# Patient Record
Sex: MALE | URBAN - METROPOLITAN AREA
[De-identification: names, ages, dates, MRNs, and addresses within clinical notes are randomized per-mention and may not be internally consistent; named-entity substitution may affect disease eponyms.]

---

## 2017-10-05 ENCOUNTER — TELEPHONE (OUTPATIENT)
Dept: SURGERY | Facility: CLINIC | Age: 42
End: 2017-10-05

## 2017-10-05 NOTE — TELEPHONE ENCOUNTER
Pre Visit Call and Assessment    Date of call:  10/05/2017    Phone numbers:  Home number on file [phone] (home)    Reached patient/confirmed appointment:  Voice mail   Patient care team/Primary provider:  No primary care provider on file.  Preferred outpatient pharmacy:  No Pharmacies Listed  Referred to:  Dr. Morales Koenig    Reason for visit: inguinal hernia     Problem List:  There is no problem list on file for this patient.      Allergies:   Allergies not on file    History:      No past medical history on file.    No past surgical history on file.    No family history on file.    Social History   Substance Use Topics     Smoking status: Not on file     Smokeless tobacco: Not on file     Alcohol use Not on file       Patient instructions:    *  Bring outside medical records, images/disc, and/or studies

## 2017-10-06 ENCOUNTER — OFFICE VISIT (OUTPATIENT)
Dept: SURGERY | Facility: CLINIC | Age: 42
End: 2017-10-06

## 2017-10-06 VITALS
TEMPERATURE: 98.1 F | OXYGEN SATURATION: 96 % | SYSTOLIC BLOOD PRESSURE: 115 MMHG | WEIGHT: 136.6 LBS | BODY MASS INDEX: 20.23 KG/M2 | HEART RATE: 66 BPM | DIASTOLIC BLOOD PRESSURE: 74 MMHG | HEIGHT: 69 IN

## 2017-10-06 DIAGNOSIS — K40.90 LEFT INGUINAL HERNIA: Primary | ICD-10-CM

## 2017-10-06 RX ORDER — ZOLPIDEM TARTRATE 10 MG/1
10 TABLET ORAL
COMMUNITY
Start: 2015-08-25 | End: 2017-11-16

## 2017-10-06 RX ORDER — VALACYCLOVIR HYDROCHLORIDE 1 G/1
TABLET, FILM COATED ORAL
COMMUNITY
Start: 2014-12-29 | End: 2017-11-16

## 2017-10-06 RX ORDER — FLUTICASONE PROPIONATE 50 MCG
2 SPRAY, SUSPENSION (ML) NASAL
COMMUNITY
Start: 2013-11-06

## 2017-10-06 RX ORDER — IBUPROFEN 200 MG
200 TABLET ORAL
COMMUNITY
Start: 2014-12-29

## 2017-10-06 RX ORDER — VALACYCLOVIR HYDROCHLORIDE 1 G/1
1 TABLET, FILM COATED ORAL
COMMUNITY
Start: 2014-12-29

## 2017-10-06 RX ORDER — FLUTICASONE PROPIONATE 50 MCG
1 SPRAY, SUSPENSION (ML) NASAL
COMMUNITY
Start: 2014-12-29 | End: 2017-11-16

## 2017-10-06 RX ORDER — AZITHROMYCIN 250 MG/1
TABLET, FILM COATED ORAL
COMMUNITY
Start: 2015-08-25 | End: 2017-11-16

## 2017-10-06 RX ORDER — FEXOFENADINE HCL 60 MG/1
TABLET, FILM COATED ORAL
COMMUNITY
Start: 2011-12-07

## 2017-10-06 ASSESSMENT — PAIN SCALES - GENERAL: PAINLEVEL: NO PAIN (0)

## 2017-10-06 NOTE — NURSING NOTE
"Chief Complaint   Patient presents with     Consult     consult       Vitals:    10/06/17 1347   BP: 115/74   BP Location: Left arm   Patient Position: Chair   Cuff Size: Adult Regular   Pulse: 66   Temp: 98.1  F (36.7  C)   SpO2: 96%   Weight: 136 lb 9.6 oz   Height: 5' 9\"       Body mass index is 20.17 kg/(m^2).  Juan Templeton MA                          "

## 2017-10-06 NOTE — LETTER
Khang Gary  [address] [place] [address]  [facility] [place] [address]      SURGERY PACKET            Your surgery is scheduled:    Date: Friday, November 17  ________________________________    Time: 7:15 am  ________________________________    Please arrive at:  6 am  ______________________    Surgeon's Name: Dr. Morales Koenig  _______________________        Pre-Op Physical Fax Numbers:          [facility]  Fax: [phone]  Phone: [phone]        Your surgery is located at:  [facility] [place] [address]  [phone]       Before Your Surgery  For Patients and Visitors at [facility]    Welcome  As you get ready for surgery, you may have a lot of questions.  This brochure will help you know what to expect before and after surgery.  You and your family are the most important members of your health care team.  You will need to take an active role in your care.    Be sure to ask questions and learn all that you can about your surgery.  If you have any safety concerns, we urge you to tell a nurse as soon as possible.   This brochure is for information only.  It does not replace the advice of your doctor.  Always follow your doctor's advice.    Please tell us if you need a .    GETTING READY FOR SURGERY  Always follow your surgeon's instructions.  If you don't, your surgery could be canceled.  Please use the following checklist.    Within 30 Days of Surgery:    Have a pre-surgery physical exam with your family doctor or partner.    If you use a [facility], all of your information from the pre-op physical will be in the [employer] computer system.    Ask the doctor to send all of your results to the hospital before the surgery.  The doctor also may ask you to bring the results with you on the day of surgery or you can fax them to [phone].  Tell the doctor if:    You are allergic to latex or rubber  (Latex and rubber gloves are  often used in medical care).    You are taking any medicines (including aspirin), vitamins (Vitamin E, Fish Oil, Omegas) or herbal products.  You will need to stop taking some medicines before surgery.    You have any medical problems (allergies, diabetes or heart disease, for example).    You have a pacemaker or an AICD (automatic implanted cardiac defibrillator).  If you do, please bring the ID card with you on the day of surgery.    You are a smoker.  People who smoke have a higher risk of infection after surgery.  Ask your doctor how you can quit smoking.  Within 7 days of Surgery:    Pre-register with the hospital.  Please use the hospital's phone number listed on the first page of this brochure.  Or, to register online, go to [email or web address].      Prior to your surgical procedure, a nurse will be contacting you to obtain a health history ([phone]).  Additionally, someone from the Admissions Department will also contact you for preregistration ([phone]).      Call your insurance company.  Ask if you need pre-approval for your surgery.  If you do not have insurance, please let us know.      Arrange for someone to drive you home after surgery.  If you will have same-day surgery, you may not drive or take public transportation home by yourself.    Arrange for someone to stay with you for 24 hours after you go home.  This person must be a responsible adult (ie- Family member or friend).  The Day Before Surgery:     Call your surgeon if there are any changes in your health.  This includes signs of a cold or flu (such as a sore throat, runny nose, cough, rash or fever).    Do not smoke, drink alcohol or take over-the-counter medicine (unless your surgeon tells you to) for 24 hours before and after surgery.    If you take prescribed drugs:  You may need to stop them until after the surgery.  Follow your doctor's orders.  You may resume Aspirin and/or blood thinners after your surgery as directed by your  physician/surgeon.    NO FOOD OR DRINK AFTER MIDNIGHT.  Follow your surgeon's orders for eating and drinking.  You need to have an empty stomach before surgery.  This will make the surgery as safe as possible.  If you don't follow your doctor's orders, your surgery could be changed to another date.  A nurse will call you within a few days of surgery to go over these and other instructions.  If you do not hear from them, please call them at [phone]  The Day of Surgery:    Take a shower or bath the night before and the morning of surgery.  Use antiseptic soap or the soap your surgeon gave you.  Gently clean the skin.  Do not shave or scrub your surgery site.    Please remove deodorant, cologne, scented lotion, makeup, nail polish and jewelry (including rings and body piercings).  If you wear artificial nails, please remove at least one nail before coming to the hospital.    Wear clean, loose clothing to the hospital.    Bring these items to the hospital:  1. Your insurance card.  2. Money for parking and co-pays (for medicines or the surgery), if needed.   3. A list of all the medicines you take.  Include vitamins, minerals, herbs and over-the-counter drugs.  Note any drug allergies.  4. A copy of your advance health care directive, if you have one.  This tells us what treatment you would want -- and who would make health care decisions -- if you could no longer speak for yourself.  You may request this form in advance or download it from [email or web address].  5. A case for any glasses, contact lenses, hearing aids or dentures.  6. Your inhaler or CPAP machine, if you use these at home.  Leave extra cash, jewelry and other valuables at home.    When You Arrive:  When you get to the hospital, you will:    Check in.  If you are under age 18, you must be with a parent or legal guardian.    Sign consent forms, if you haven't already.  These forms state that you know the risks and benefits of surgery.  When you  sign the forms, you give us permission to do the surgery.  Do not sign them unless you understand what will happen during and after your surgery.  If you have any questions about your surgery, ask to speak with your doctor before you sign the forms.  If you don't understand the answers, ask again.    Receive a copy of the Patients Bill of Rights.  If you do not receive a copy, please ask for one.    Change into hospital clothes.  Your belongings will be placed in a bag.  We will return them to you after surgery.    Meet with the anesthesia provider.  He or she will tell you what kind of anesthesia (medicine) will be used to keep you comfortable during surgery.  Remember: It's okay to remind doctors and nurses to wash their hands before touching you.   In most cases, your surgeon will use a marker to write his or her initials on the surgery site.  This ensures that the exact site is operated on.  For safety reasons, we will ask you the same questions many times.  For example, we may ask your name and birth date over and over again.  Friends and family can stay with you until it's time for surgery.  While you're in surgery, they will be in the waiting area.  Please note that cell phones are not allowed in some patient care areas.  If you have questions about what will happen in the operating room, talk to your care team.  After Surgery:    We will move you to a recovery room where we will watch you closely.  If you have any pain or discomfort, tell your nurse.  He or she will try to make you comfortable.      If you are staying overnight we will move you to your hospital room after you are awake.    If you are going home we will move you to another room.  Friends and family may be able to join you.  The length of time you spend in recovery depends on the type of medicine you received, your medical condition, and the type of surgery you had.  Dealing with pain:  A nurse will check your comfort level often during your  "stay.  He or she will work with you to manage your pain.  Remember:    All pain is real.  There are many ways to control pain.  We can help you decide what works best for you.    Ask for pain medicine when you need it.  Don't try to \"tough it out\" -- this can make you feel worse.  Always take your medicine as ordered.    Medicine doesn't work the same for everyone.  If your medicine isn't working tell your nurse.  There may be other medicines or treatments we can try.  Going Home:  We will let you know when you're ready to leave the hospital.  Before you leave, we will tell you how to care for yourself at home and prevent infections.  If you do not understand something, please say so.  We will answer any questions you have.  We will then help you get ready to leave.  You must have an adult with you for the first 24 hours after you leave the hospital. Take it easy when you get home.  You will need some time to recover -- you may be more tired than you realize at first.  Rest and relax for at least the first 24 hours at home.  You'll feel better and heal faster if you take good care of yourself.                      Pre-Operative Surgery Scrub    Purpose:   The skin harbors a large variety of bacteria, both infectious and noninfectious.  Showering with an antiseptic soap prior to an invasive procedure will decrease the number of transient and resident (good and bad) bacteria that is normally found on the skin.    Procedure:      Shower or bathe with 1/2 of the bottle of antiseptic soap (enclosed) the evening before and 1/2 of the bottle the morning of surgery (bathing the day of the procedure is most important).       Apply the soap at full strength (use the entire bottle).  Gently clean the skin, rinse, and dry with a clean towel that is freshly laundered (out of the dryer) and then put on clean clothing that is freshly laundered.        We have given you information regarding pre-op showering.  We recommend that " patients wash with an antiseptic soap prior to surgery.  This cleanser will be given to you at the clinic or mailed to your home.  It is advised that you liberally wash the specific area surgery area the night before, and again in the morning before the surgery.  Do not apply lotion afterward.  We would like to keep the skin as clean as possible.    Thank you for following these important instructions.      You have been scheduled for surgery and we would like to give you some information that will assist in helping get the best possible outcome.      Before Surgery:   If for any reason you decide not to have the surgery, please contact our office.  We can easily cancel or reschedule the procedure. Please call the  at [phone].      Any pain related to the surgery that occurs before the surgery needs to be reported and managed by your primary care or referring doctor.      Please keep in mind that the time of surgery is subject to change.  Make sure you have nothing to eat or drink after midnight.  If your surgery is later in the afternoon, this recommendation might change, but not until the day before surgery after the actual time of the surgery has been established.    After Surgery:  When you are discharged from the recovery room, the nurses will review instructions with you and your caregiver.      Please wash your hands every time you touch the wound or change bandages or dressings.      Do not submerge the wound in water.  You may not use a bathtub or hot tub until the wound is closed.  The wait time frame is generally 2-3 weeks but any open area can be a source of incoming bacteria, so it is better to be on the safe side and avoid the tub until your wound is fully healed.      You may take a shower 24 hours after surgery.  Double check with your surgeon if it is ok for water to run over a wound, whether it has been sutured, stapled, glued or is open.  You may gently wash the wound using the  antiseptic soap provided for your pre-surgery showering (do not use a washcloth).  Any mild soap will work as well.      Many surgical wounds will have small white strips of tape on them called Steri Strips.  Do not remove these.  The edges will curl and fall off within 7-10 days with normal showering.      If you are going home with sutures (stitches) or staples, you must return to the clinic to have them taken out, usually within 1-2 weeks.      Signs and symptoms of infection include:  1. Fever, temperature over 101.5 ' F  2. Redness  3. Swelling  4. Increasing pain  5. Green or yellow drainage which may or may not have a foul odor.    Symptoms of infection need to be reported to your surgery office. Please call the nurse at [phone], Option #3.    If you or your  are deaf or hard of hearing, or prefer a language other than English, please let us know.  We have many free services, including interpreters and other aids to help you communicate. You may ask for help  through any member of your care team or by calling Language Services at [phone], option 2.

## 2017-10-06 NOTE — PATIENT INSTRUCTIONS
Laparoscopic Inguinal Hernia Repair Teaching Sheet      1.  Wound care:  Remove Band-Aids 24 hours after surgery.  Leave the medical tape (Steri-strips) in place.  The tape should stay on for 5-7 days.  You may remove the Steri-Strips after one week.   Please wash your hands before touching your incisions or removing dressings.    2.  You may resume all of your home medications after surgery.  Please do not start Aspirin or blood thinners until the day after surgery.    3.  You may shower 24 hours after surgery.  Do not submerge yourself in water for 7 days (bath, whirlpool, hot tub, pool, lake, etc).      4.  Restrictions:  None    5.  Pain management:  Apply ice pack to groin for 24 hours protecting the skin with a cloth.  Take prescribed pain medication as directed and only as needed.  Please do not take any additional Acetaminophen or Tylenol products while taking narcotic pain medications.  We encourage the use of Ibuprofen, Advil, or Motrin after surgery except if you have an allergy, ulcer, kidney problems, or it is contraindicated by a provider.  A TAP Block may be recommended the day of surgery (see information sheet below).    6.  Athletic supporter (male):  Wear for the first 3 days.  You may wear longer if you wish.    7.  Urinary retention:  If you are unable to urinate, feel pressure.  Please go to the Emergency Room at the [facility].  A catheter will be placed to empty your bladder and should be left in for a minimum of 48 hours.  The catheter can be removed in our office.    8.  It is normal to feel a lump in your groin after surgery.  This lump may take up to 8 weeks to completely go away.    9.  Our office will call you 2-4 days after your procedure to review post-op teaching, answer questions, and help arrange after surgery care.    10. Watch for signs of infection:  Redness of the wound, drainage, increasing pain, and/or fever/chills (greater than 101  degrees F).    11. Constipation:  Please take prescribed stool softener as directed.  You may stop taking it when you are no longer taking narcotic pain medications and your bowels have returned to normal.  If you become constipated it is safe to take an over-the-counter laxative as directed on the bottle.    12. Driving:  You may drive  when you are no longer taking prescription pain medications  and you feel comfortable operating a vehicle.       13. Diet:  You may resume your regular diet after surgery.        If you have questions or concerns please contact our office Monday through Friday during regular office hours at [phone].  If you are calling during nonbusiness hours, the weekend, or holiday please call the hospital  at [phone] and ask for the on-call General Surgeon.

## 2017-10-06 NOTE — LETTER
10/6/2017       RE: Khang Gary  [address] [place] [id number]     Dear Colleague,    Thank you for referring your patient, Khang Gary, to the [facility] GENERAL SURGERY at [facility]. Please see a copy of my visit note below.    Khang Gary is a 42 year old male with a 7 month history of a left inguinal mass with the following symptoms of lump.    Finding was not work related.  Onset did not occur with lifting.  Obstructive symptoms:  no  Urinary difficulties:  no  Chronic cough: no  Constipation:  no  Current level of activity:  Low, plays golf and chases his twin eight year old girls.    Past medical and surgical history, medications, allergies, family history, and social history were reviewed with the patient. Had open RIH at age 18. Works IT.    ROS: 10 point review of systems negative except noted in HPI  PHYSICAL EXAM  General appearance- healthy, alert, and in no distress.  Skin- Skin color, texture, and turgor normal.  No rashes.  Neck- Neck is supple with no obvious adenopathy.  Lungs- Respiratory effort unlabored.  Gait- Normal.  Abdomen - flat soft non tender  Left inguinal hernia, right without defect.    Impression:  Inguinal hernia, left  Recommendation:  Laparoscopic Inguinal Hernioplasty    A full discussion regarding the alternatives, risks, goals, and potential complications for this surgery was completed today.  The patient understood that the potential problems included but are not limited to:  Infection, bleeding, hematoma, seroma, recurrence, injury to nerve/muscle or involved testicle, ischemic orchitis, and chronic pain.    The patient verbally expressed understanding, was given the opportunity for questions, and gives full informed consent for the procedure.      Today the patient was instructed on the need for a preoperative H&P, NPO status prior to surgery, and the need to stop anticoagulants prior to surgery.  Additional educational material,  soap, and instructions will be mailed out to the patients home.    The total time spent with this patient was 30 minutes.  Of this time, greater than 50% was spent counseling and coordinating care.      Lovenox:  No            Again, thank you for allowing me to participate in the care of your patient.      Sincerely,    Morales Koenig MD

## 2017-10-06 NOTE — LETTER
Date:October 9, 2017      Patient was self referred, no letter generated. Do not send.        [facility] Health Information

## 2017-10-06 NOTE — NURSING NOTE
Pre and Post op Patient Education/Teaching Flowsheet  Relevant Diagnosis:  hernia  Teaching Topic:  Pre and post op teaching  Person(s) Involved in teaching:  Patient and Wife     Motivation Level:  Asks Questions:  Yes  Eager to Learn:  Yes  Cooperative:  Yes  Receptive (willing/able to accept information):  Yes  Any cultural factors/Faith beliefs that may influence understanding or compliance?  No    Patient/caregiver/family demonstrates understanding of the following:  Reason for the appointment, diagnosis, and treatment plan:  Yes  Patient demonstrates understanding of the following:  Pre-op bowel prep:  No  Post-op pain management recommendations (medications, ice compress, binder/athletic supporter (if applicable), etc.:  Yes  Inguinal hernia patients:  Post-op urinary retention- discussed signs/symptoms and visit to ER for Grajeda catheter placement and to stay in place for at least 48 hours:  Yes  Restrictions:  Yes  Medications to take the day of surgery:  Per PCP  Blood thinner medications discussed and when to stop (if applicable):  Yes  Wound care:  Yes  Diabetes medication management (if applicable):  Per PCP  Which situations necessitate calling provider and whom to contact:  Discussed how to contact the hospital, nurse, and clinic scheduling staff if necessary      Date and time of surgery:  Yes  Location of surgery:  [facility] [place]- 5th Floor  History and Physical and any other testing necessary prior to surgery:  Yes  Required time line for completion of History and Physical and any pre-op testing:  Yes  Discuss need for someone to drive patient home and stay with them for 24 hours:  Yes  Pre-op showering/scrub information with Surgical Scrub:  Yes  NPO Guidelines:  NPO per Anesthesia Guidelines      Infection Prevention: Patient demonstrates understanding of the following:  Patient instructed on hand hygiene:  Yes  Surgical procedure site care will be taught and  will be reviewed at the time of discharge  Signs and symptoms of infection taught:  Yes  Wound care reviewed and will be taught at the time of discharge  Central venous catheter care will be taught at the time of discharge (if applicable)    Post-op follow-up:  Instructional materials used/given/mailed:  [facility] Booklet, post op teaching sheet, Map, Soap, and arrival/location information    Surgical instructions mailed to patient

## 2017-10-06 NOTE — MR AVS SNAPSHOT
After Visit Summary   10/6/2017    Khang Gary    MRN: [id number]           Patient Information     Date Of Birth          1975        Visit Information        Provider Department      10/6/2017 2:00 PM Morales Koenig MD [facility] Surgery        Care Instructions    Laparoscopic Inguinal Hernia Repair Teaching Sheet      1.  Wound care:  Remove Band-Aids 24 hours after surgery.  Leave the medical tape (Steri-strips) in place.  The tape should stay on for 5-7 days.  You may remove the Steri-Strips after one week.   Please wash your hands before touching your incisions or removing dressings.    2.  You may resume all of your home medications after surgery.  Please do not start Aspirin or blood thinners until the day after surgery.    3.  You may shower 24 hours after surgery.  Do not submerge yourself in water for 7 days (bath, whirlpool, hot tub, pool, lake, etc).      4.  Restrictions:  None    5.  Pain management:  Apply ice pack to groin for 24 hours protecting the skin with a cloth.  Take prescribed pain medication as directed and only as needed.  Please do not take any additional Acetaminophen or Tylenol products while taking narcotic pain medications.  We encourage the use of Ibuprofen, Advil, or Motrin after surgery except if you have an allergy, ulcer, kidney problems, or it is contraindicated by a provider.  A TAP Block may be recommended the day of surgery (see information sheet below).    6.  Athletic supporter (male):  Wear for the first 3 days.  You may wear longer if you wish.    7.  Urinary retention:  If you are unable to urinate, feel pressure.  Please go to the Emergency Room at the [facility].  A catheter will be placed to empty your bladder and should be left in for a minimum of 48 hours.  The catheter can be removed in our office.    8.  It is normal to feel a lump in your groin after surgery.  This lump may take up to 8  weeks to completely go away.    9.  Our office will call you 2-4 days after your procedure to review post-op teaching, answer questions, and help arrange after surgery care.    10. Watch for signs of infection:  Redness of the wound, drainage, increasing pain, and/or fever/chills (greater than 101 degrees F).    11. Constipation:  Please take prescribed stool softener as directed.  You may stop taking it when you are no longer taking narcotic pain medications and your bowels have returned to normal.  If you become constipated it is safe to take an over-the-counter laxative as directed on the bottle.    12. Driving:  You may drive  when you are no longer taking prescription pain medications  and you feel comfortable operating a vehicle.       13. Diet:  You may resume your regular diet after surgery.        If you have questions or concerns please contact our office Monday through Friday during regular office hours at [phone].  If you are calling during nonbusiness hours, the weekend, or holiday please call the hospital  at [phone] and ask for the on-call General Surgeon.                        Follow-ups after your visit        Who to contact     Please call your clinic at [phone] to:    Ask questions about your health    Make or cancel appointments    Discuss your medicines    Learn about your test results    Speak to your doctor   If you have compliments or concerns about an experience at your clinic, or if you wish to file a complaint, please contact [facility] Patient Relations at [phone] or email us at [email or web address]         Additional Information About Your Visit        MyChart Information     [employer] gives you secure access to your electronic health record. If you see a primary care provider, you can also send messages to your care team and make appointments. If you have questions, please call your primary care clinic.  If you do not have  "a primary care provider, please call [phone] and they will assist you.      [employer] is an electronic gateway that provides easy, online access to your medical records. With [employer], you can request a clinic appointment, read your test results, renew a prescription or communicate with your care team.     To access your existing account, please contact your [facility] or call [phone] for assistance.        Care EveryWhere ID     This is your Care EveryWhere ID. This could be used by other organizations to access your [place] medical records  [id number]        Your Vitals Were     Pulse Temperature Height Pulse Oximetry BMI (Body Mass Index)       66 98.1  F (36.7  C) 1.753 m (5' 9\") 96% 20.17 kg/m2        Blood Pressure from Last 3 Encounters:   10/06/17 115/74    Weight from Last 3 Encounters:   10/06/17 62 kg (136 lb 9.6 oz)              Today, you had the following     No orders found for display       Primary Care Provider    None Specified       No primary provider on file.        Equal Access to Services     TERRY [facility] Center[facility]ACQUI : Hadii aad ku hadasho Soomaali, waaxda luqadaha, qaybta kaalmada adeegyada, nolberto rueda . So [facility] [phone].    ATENCIÓN: Si habla español, tiene a santana disposición servicios gratuitos de asistencia lingüística. Llame al [phone].    We comply with applicable federal civil rights laws and [place] laws. We do not discriminate on the basis of race, color, national origin, age, disability, sex, sexual orientation, or gender identity.            Thank you!     Thank you for choosing [facility]  for your care. Our goal is always to provide you with excellent care. Hearing back from our patients is one way we can continue to improve our services. Please take a few minutes to complete the written survey that you may receive in the mail after your visit with us. Thank you!             Your Updated Medication " List - Protect others around you: Learn how to safely use, store and throw away your medicines at www.disposemymeds.org.          This list is accurate as of: 10/6/17  2:05 PM.  Always use your most recent med list.                   Brand Name Dispense Instructions for use Diagnosis    azithromycin 250 MG tablet    ZITHROMAX     Take 2 tablets by mouth daily for 3 days  Indications: TRAVELER'S DIARRHEA        fexofenadine 60 MG tablet    ALLEGRA     TAKE ONE TABLET BY MOUTH TWICE DAILY AS NEEDED        * fluticasone 50 MCG/ACT spray    FLONASE     2 sprays        * fluticasone 50 MCG/ACT spray    FLONASE     1 spray        ibuprofen 200 MG tablet    ADVIL/MOTRIN     Take 200 mg by mouth        * valACYclovir 1000 mg tablet    VALTREX     Indications: PN:   HOOD GALDAMEZ Aug 25, 2015 10:07 AM Received from: External Pharmacy        * valACYclovir 1000 mg tablet    VALTREX     Take 1 g by mouth        zolpidem 10 MG tablet    AMBIEN     Take 10 mg by mouth        * Notice:  This list has 4 medication(s) that are the same as other medications prescribed for you. Read the directions carefully, and ask your doctor or other care provider to review them with you.

## 2017-10-06 NOTE — PROGRESS NOTES
Khang Gary is a 42 year old male with a 7 month history of a left inguinal mass with the following symptoms of lump.    Finding was not work related.  Onset did not occur with lifting.  Obstructive symptoms:  no  Urinary difficulties:  no  Chronic cough: no  Constipation:  no  Current level of activity:  Low, plays golf and chases his twin eight year old girls.    Past medical and surgical history, medications, allergies, family history, and social history were reviewed with the patient. Had open RIH at age 18. Works IT.    ROS: 10 point review of systems negative except noted in HPI  PHYSICAL EXAM  General appearance- healthy, alert, and in no distress.  Skin- Skin color, texture, and turgor normal.  No rashes.  Neck- Neck is supple with no obvious adenopathy.  Lungs- Respiratory effort unlabored.  Gait- Normal.  Abdomen - flat soft non tender  Left inguinal hernia, right without defect.    Impression:  Inguinal hernia, left  Recommendation:  Laparoscopic Inguinal Hernioplasty    A full discussion regarding the alternatives, risks, goals, and potential complications for this surgery was completed today.  The patient understood that the potential problems included but are not limited to:  Infection, bleeding, hematoma, seroma, recurrence, injury to nerve/muscle or involved testicle, ischemic orchitis, and chronic pain.    The patient verbally expressed understanding, was given the opportunity for questions, and gives full informed consent for the procedure.      Today the patient was instructed on the need for a preoperative H&P, NPO status prior to surgery, and the need to stop anticoagulants prior to surgery.  Additional educational material, soap, and instructions will be mailed out to the patients home.    The total time spent with this patient was 30 minutes.  Of this time, greater than 50% was spent counseling and coordinating care.      Lovenox:  No

## 2017-11-16 ENCOUNTER — ANESTHESIA EVENT (OUTPATIENT)
Dept: SURGERY | Facility: AMBULATORY SURGERY CENTER | Age: 42
End: 2017-11-16

## 2017-11-17 ENCOUNTER — SURGERY (OUTPATIENT)
Age: 42
End: 2017-11-17

## 2017-11-17 ENCOUNTER — HOSPITAL ENCOUNTER (OUTPATIENT)
Facility: AMBULATORY SURGERY CENTER | Age: 42
End: 2017-11-17
Attending: SURGERY

## 2017-11-17 ENCOUNTER — ANESTHESIA (OUTPATIENT)
Dept: SURGERY | Facility: AMBULATORY SURGERY CENTER | Age: 42
End: 2017-11-17

## 2017-11-17 VITALS
TEMPERATURE: 97.2 F | HEART RATE: 65 BPM | BODY MASS INDEX: 20.29 KG/M2 | DIASTOLIC BLOOD PRESSURE: 83 MMHG | SYSTOLIC BLOOD PRESSURE: 123 MMHG | WEIGHT: 137 LBS | RESPIRATION RATE: 16 BRPM | OXYGEN SATURATION: 98 % | HEIGHT: 69 IN

## 2017-11-17 DIAGNOSIS — K40.90 LEFT INGUINAL HERNIA: Primary | ICD-10-CM

## 2017-11-17 DEVICE — IMPLANTABLE DEVICE: Type: IMPLANTABLE DEVICE | Status: FUNCTIONAL

## 2017-11-17 RX ORDER — ACETAMINOPHEN 325 MG/1
975 TABLET ORAL ONCE
Status: COMPLETED | OUTPATIENT
Start: 2017-11-17 | End: 2017-11-17

## 2017-11-17 RX ORDER — PROPOFOL 10 MG/ML
INJECTION, EMULSION INTRAVENOUS PRN
Status: DISCONTINUED | OUTPATIENT
Start: 2017-11-17 | End: 2017-11-17

## 2017-11-17 RX ORDER — FENTANYL CITRATE 50 UG/ML
25-50 INJECTION, SOLUTION INTRAMUSCULAR; INTRAVENOUS EVERY 5 MIN PRN
Status: DISCONTINUED | OUTPATIENT
Start: 2017-11-17 | End: 2017-11-17 | Stop reason: HOSPADM

## 2017-11-17 RX ORDER — SODIUM CHLORIDE, SODIUM LACTATE, POTASSIUM CHLORIDE, CALCIUM CHLORIDE 600; 310; 30; 20 MG/100ML; MG/100ML; MG/100ML; MG/100ML
INJECTION, SOLUTION INTRAVENOUS CONTINUOUS
Status: DISCONTINUED | OUTPATIENT
Start: 2017-11-17 | End: 2017-11-17 | Stop reason: HOSPADM

## 2017-11-17 RX ORDER — NALOXONE HYDROCHLORIDE 0.4 MG/ML
.1-.4 INJECTION, SOLUTION INTRAMUSCULAR; INTRAVENOUS; SUBCUTANEOUS
Status: DISCONTINUED | OUTPATIENT
Start: 2017-11-17 | End: 2017-11-18 | Stop reason: HOSPADM

## 2017-11-17 RX ORDER — KETAMINE HYDROCHLORIDE 10 MG/ML
INJECTION, SOLUTION INTRAMUSCULAR; INTRAVENOUS PRN
Status: DISCONTINUED | OUTPATIENT
Start: 2017-11-17 | End: 2017-11-17

## 2017-11-17 RX ORDER — ONDANSETRON 2 MG/ML
4 INJECTION INTRAMUSCULAR; INTRAVENOUS EVERY 30 MIN PRN
Status: DISCONTINUED | OUTPATIENT
Start: 2017-11-17 | End: 2017-11-18 | Stop reason: HOSPADM

## 2017-11-17 RX ORDER — PROPOFOL 10 MG/ML
INJECTION, EMULSION INTRAVENOUS CONTINUOUS PRN
Status: DISCONTINUED | OUTPATIENT
Start: 2017-11-17 | End: 2017-11-17

## 2017-11-17 RX ORDER — LIDOCAINE HYDROCHLORIDE 20 MG/ML
INJECTION, SOLUTION INFILTRATION; PERINEURAL PRN
Status: DISCONTINUED | OUTPATIENT
Start: 2017-11-17 | End: 2017-11-17

## 2017-11-17 RX ORDER — ONDANSETRON 2 MG/ML
INJECTION INTRAMUSCULAR; INTRAVENOUS PRN
Status: DISCONTINUED | OUTPATIENT
Start: 2017-11-17 | End: 2017-11-17

## 2017-11-17 RX ORDER — DEXAMETHASONE SODIUM PHOSPHATE 10 MG/ML
INJECTION, SOLUTION INTRAMUSCULAR; INTRAVENOUS PRN
Status: DISCONTINUED | OUTPATIENT
Start: 2017-11-17 | End: 2017-11-17

## 2017-11-17 RX ORDER — ONDANSETRON 4 MG/1
4 TABLET, ORALLY DISINTEGRATING ORAL EVERY 30 MIN PRN
Status: DISCONTINUED | OUTPATIENT
Start: 2017-11-17 | End: 2017-11-18 | Stop reason: HOSPADM

## 2017-11-17 RX ORDER — MEPERIDINE HYDROCHLORIDE 25 MG/ML
12.5 INJECTION INTRAMUSCULAR; INTRAVENOUS; SUBCUTANEOUS
Status: DISCONTINUED | OUTPATIENT
Start: 2017-11-17 | End: 2017-11-18 | Stop reason: HOSPADM

## 2017-11-17 RX ORDER — OXYCODONE HYDROCHLORIDE 5 MG/1
5 TABLET ORAL ONCE
Status: COMPLETED | OUTPATIENT
Start: 2017-11-17 | End: 2017-11-17

## 2017-11-17 RX ORDER — AMOXICILLIN 250 MG
1-2 CAPSULE ORAL 2 TIMES DAILY
Qty: 30 TABLET | Refills: 0 | Status: SHIPPED | OUTPATIENT
Start: 2017-11-17

## 2017-11-17 RX ORDER — BUPIVACAINE HYDROCHLORIDE 5 MG/ML
INJECTION, SOLUTION PERINEURAL PRN
Status: DISCONTINUED | OUTPATIENT
Start: 2017-11-17 | End: 2017-11-17 | Stop reason: HOSPADM

## 2017-11-17 RX ORDER — SODIUM CHLORIDE, SODIUM LACTATE, POTASSIUM CHLORIDE, CALCIUM CHLORIDE 600; 310; 30; 20 MG/100ML; MG/100ML; MG/100ML; MG/100ML
INJECTION, SOLUTION INTRAVENOUS CONTINUOUS
Status: DISCONTINUED | OUTPATIENT
Start: 2017-11-17 | End: 2017-11-18 | Stop reason: HOSPADM

## 2017-11-17 RX ORDER — GABAPENTIN 300 MG/1
300 CAPSULE ORAL ONCE
Status: COMPLETED | OUTPATIENT
Start: 2017-11-17 | End: 2017-11-17

## 2017-11-17 RX ORDER — HYDROCODONE BITARTRATE AND ACETAMINOPHEN 5; 325 MG/1; MG/1
1-2 TABLET ORAL EVERY 4 HOURS PRN
Qty: 10 TABLET | Refills: 0 | Status: SHIPPED | OUTPATIENT
Start: 2017-11-17

## 2017-11-17 RX ORDER — HYDROCODONE BITARTRATE AND ACETAMINOPHEN 5; 325 MG/1; MG/1
2 TABLET ORAL
Status: DISCONTINUED | OUTPATIENT
Start: 2017-11-17 | End: 2017-11-18 | Stop reason: HOSPADM

## 2017-11-17 RX ADMIN — DEXAMETHASONE SODIUM PHOSPHATE 4 MG: 10 INJECTION, SOLUTION INTRAMUSCULAR; INTRAVENOUS at 07:20

## 2017-11-17 RX ADMIN — ACETAMINOPHEN 975 MG: 325 TABLET ORAL at 06:25

## 2017-11-17 RX ADMIN — LIDOCAINE HYDROCHLORIDE 100 MG: 20 INJECTION, SOLUTION INFILTRATION; PERINEURAL at 07:20

## 2017-11-17 RX ADMIN — GABAPENTIN 300 MG: 300 CAPSULE ORAL at 06:25

## 2017-11-17 RX ADMIN — SODIUM CHLORIDE, SODIUM LACTATE, POTASSIUM CHLORIDE, CALCIUM CHLORIDE: 600; 310; 30; 20 INJECTION, SOLUTION INTRAVENOUS at 07:15

## 2017-11-17 RX ADMIN — OXYCODONE HYDROCHLORIDE 5 MG: 5 TABLET ORAL at 08:38

## 2017-11-17 RX ADMIN — PROPOFOL 175 MCG/KG/MIN: 10 INJECTION, EMULSION INTRAVENOUS at 07:18

## 2017-11-17 RX ADMIN — ONDANSETRON 4 MG: 2 INJECTION INTRAMUSCULAR; INTRAVENOUS at 07:20

## 2017-11-17 RX ADMIN — BUPIVACAINE HYDROCHLORIDE 30 ML: 5 INJECTION, SOLUTION PERINEURAL at 08:39

## 2017-11-17 RX ADMIN — PROPOFOL 50 MG: 10 INJECTION, EMULSION INTRAVENOUS at 07:26

## 2017-11-17 RX ADMIN — Medication 50 MG: at 07:20

## 2017-11-17 RX ADMIN — PROPOFOL 250 MG: 10 INJECTION, EMULSION INTRAVENOUS at 07:20

## 2017-11-17 RX ADMIN — KETAMINE HYDROCHLORIDE 20 MG: 10 INJECTION, SOLUTION INTRAMUSCULAR; INTRAVENOUS at 07:26

## 2017-11-17 NOTE — IP AVS SNAPSHOT
MRN:[id number]                      After Visit Summary   11/17/2017    Khang Gary    MRN: [id number]           Thank you!     Thank you for choosing [facility] for your care. Our goal is always to provide you with excellent care. Hearing back from our patients is one way we can continue to improve our services. Please take a few minutes to complete the written survey that you may receive in the mail after you visit with us. Thank you!        Patient Information     Date Of Birth          1975        About your hospital stay     You were admitted on:  November 17, 2017 You last received care in the:  [facility]    You were discharged on:  November 17, 2017       Who to Call     For medical emergencies, please call 911.  For non-urgent questions about your medical care, please call your primary care provider or clinic, None  For questions related to your surgery, please call your surgery clinic        Attending Provider     Provider Morales Whitman MD Surgery       Primary Care Provider    None Specified      After Care Instructions     Diet Instructions       Resume pre-procedure diet            Shower       No shower for 24 hours post procedure. May shower Postoperative Day (POD)  1                  Further instructions from your care team       [facility]  Home Care Following Anesthesia  For 24 hours after surgery:  1. Get plenty of rest.  A responsible adult must stay with you for at least 24 hours after you leave the surgery center.  2. Do not drive or use heavy equipment.  If you have weakness or tingling, don't drive or use heavy equipment until this feeling goes away.   3. Do not drink alcohol.   4. Avoid strenuous or risky activities.  Ask for help when climbing stairs.  5. You may feel lightheaded.  IF so, sit for a few minutes before standing.  Have someone help you get up.   6. If you have nausea (feel  "sick to your stomach): Drink only clear liquids such as apple juice, ginger ale, broth or 7-Up.  Rest may also help.  Be sure to drink enough fluids.  Move to a regular diet as you feel able.   7. You may have a slight fever.  Call the doctor if your fever is over 100 F (37.7 C) (taken under the tongue) or lasts longer than 24 hours.  8. You may have a dry mouth, a sore throat, muscle aches or trouble sleeping. These should go away after 24 hours.  9. Do not make important or legal decisions.        Today you received a Marcaine or bupivacaine block to numb the nerves near your surgery site.  This is a block using local anesthetic or \"numbing\" medication injected around the nerves to anesthetize or \"numb\" the area supplied by those nerves.  This block is injected into the muscle layer near your surgical site.  The medication may numb the location where you had surgery for 6-18 hours, but may last up to 24 hours.  If your surgical site is an arm or leg you should be careful with your affected limb, since it is possible to injure your limb without being aware of it due to the numbing.  Until full feeling returns, you should guard against bumping or hitting your limb, and avoid extreme hot or cold temperatures on the skin.  As the block wears off, the feeling will return as a tingling or prickly sensation near your surgical site.  You will experience more discomfort from your incision as the feeling returns.  You may want to take a pain pill (a narcotic or Tylenol if this was prescribed by your surgeon) when you start to experience mild pain before the pain beccomes more severe.  If your pain medications do not control your pain you should notifiy your surgeon.    Tips for taking pain medications  To get the best pain relief possible, remember these points:    Take pain medications as directed, before pain becomes severe.    Pain medication can upset your stomach: taking it with food may help.    Constipation is a " common side effect of pain medication. Drink plenty of  fluids.    Eat foods high in fiber. Take a stool softener if recommended by your doctor or pharmacist.    Do not drink alcohol, drive or operate machinery while taking pain medications.    Ask about other ways to control pain, such as with heat, ice or relaxation.    Tylenol/Acetaminophen Consumption  To help encourage the safe use of acetaminophen, the makers of TYLENOL  have lowered the maximum daily dose for single-ingredient Extra Strength TYLENOL  (acetaminophen) products sold in the [place] from 8 pills per day (4,000 mg) to 6 pills per day (3,000 mg). The dosing interval has also changed from 2 pills every 4-6 hours to 2 pills every 6 hours.    If you feel your pain relief is insufficient, you may take Tylenol/Acetaminophen in addition to your narcotic pain medication.     Be careful not to exceed 3,000 mg of Tylenol/Acetaminophen in a 24 hour period from all sources.    If you are taking extra strength Tylenol/acetaminophen (500 mg), the maximum dose is 6 tablets in 24 hours.    If you are taking regular strength acetaminophen (325 mg), the maximum dose is 9 tablets in 24 hours.    Call a doctor for any of the followin. Signs of infection (fever, growing tenderness at the surgery site, a large amount of drainage or bleeding, severe pain, foul-smelling drainage, redness, swelling).  2. It has been over 8 to 10 hours since surgery and you are still not able to urinate (pass water).  3. Headache for over 24 hours.    Your doctor is:  Dr. Morales Koenig, General Surgery: [phone]                  Or dial [phone] and ask for the resident on call for:  General Surgery  For emergency care, call the:  [place] [facility]:  [phone] (TTY for hearing impaired: [phone])      After Your Laparoscopic Inguinal Hernia Repair         Incision care     Remove the bandage the day after surgery, but leave the medical tape (Steri-Strips) or  glue in place. These will loosen and fall off on their own 5 to 7 days after surgery.     You may take a shower the day after surgery. Carefully wash your incision with soap and water. Do not submerge yourself in water (bath, whirlpool, hot tub, pool, lake) for 14 days after surgery.       Always wash your hands before touching your incisions or removing bandages.      Other medicines     Wait to start aspirin or blood thinners until the day after surgery. You can continue your regular medicines at your normal time the day after surgery.     Your pain medicine may cause constipation (hard, dry stools). To help with this, take the stool softener your doctor gave you or an over-the-counter stool softener or laxative. You can stop taking this when you are no longer taking pain medicine and your bowel movements are back to normal.      For pain or discomfort     Take the narcotic pain medicine your doctor gave you as needed and as instructed on the bottle. If you prefer to use over-the-counter medication, use acetaminophen (Tylenol) or ibuprofen (Advil, Motrin) as instructed on the box. Do not take Tylenol if it is in your narcotic pain medication.     Use an ice pack on your groin for 20 minutes at a time as needed for the first 24 hours. Be sure to protect your skin by putting a cloth between the ice pack and your skin.     After 24 hours you can switch to heat for 20 minutes as needed. Be sure to protect your skin by putting a cloth between the heat pack and your skin.     It is normal to feel a lump in your groin after surgery. This lump may take up to 8 weeks to go away.      Activities     No driving until you feel it s safe to do so. Don t drive while taking narcotic pain medicine.     You can return to your other activities as normal, no restrictions.      Special equipment     If we gave you an athletic supporter, wear this for the first 3 days after surgery. You can wear it longer than this if you wish.     "  Diet     You can eat your regular meals after surgery.      When to call the doctor   Call your doctor if you have:     A fever above 101 F (38.3 C) (taken under the tongue), or a fever or chills lasting more than a day.     Redness at the incision site.     Any fluid or blood draining from the incision, especially if it smells bad.      Severe pain that doesn t improve with pain medicine.      Go to the emergency room if:   You can t urinate (pee) or feel pressure when you urinate. We will place a small, thin tube (catheter) to empty your bladder. This needs to stay in place for at least 2 days.      We will call you 2 to 4 days after surgery to review this handout, answer questions and help arrange after-surgery care. If you have questions or concerns, please call [phone] during regular office hours. If you need to call after business hours, call [phone] and ask to page the surgeon on-call.                           Pending Results     No orders found from 11/15/2017 to 11/18/2017.            Admission Information     Date & Time Provider Department Dept. Phone    11/17/2017 Morales Koenig MD [facility] [phone]      Your Vitals Were     Blood Pressure Pulse Temperature Respirations Height Weight    125/96 65 97.3  F (36.3  C) (Temporal) 14 1.753 m (5' 9\") 62.1 kg (137 lb)    Pulse Oximetry BMI (Body Mass Index)                97% 20.23 kg/m2          [employer] Information     [employer] gives you secure access to your electronic health record. If you see a primary care provider, you can also send messages to your care team and make appointments. If you have questions, please call your primary care clinic.  If you do not have a primary care provider, please call [phone] and they will assist you.      [employer] is an electronic gateway that provides easy, online access to your medical records. With [employer], you can request a clinic appointment, read your test " results, renew a prescription or communicate with your care team.     To access your existing account, please contact your [facility] or call [phone] for assistance.        Care EveryWhere ID     This is your Care EveryWhere ID. This could be used by other organizations to access your [place] medical records  [id number]        Equal Access to Services     TERRY ASHTON : Michael le Sosree, waaxda luqadaha, qaybta kaalmada maría, nolberto spragueargentinadavid rueda . So [facility] [phone].    ATENCIÓN: Si habla español, tiene a santana disposición servicios gratuitos de asistencia lingüística. Luis al [phone].    We comply with applicable federal civil rights laws and [place] laws. We do not discriminate on the basis of race, color, national origin, age, disability, sex, sexual orientation, or gender identity.               Review of your medicines      START taking        Dose / Directions    HYDROcodone-acetaminophen 5-325 MG per tablet   Commonly known as:  NORCO   Used for:  Left inguinal hernia        Dose:  1-2 tablet   Take 1-2 tablets by mouth every 4 hours as needed for other (Moderate to Severe Pain)   Quantity:  10 tablet   Refills:  0       senna-docusate 8.6-50 MG per tablet   Commonly known as:  SENOKOT-S;PERICOLACE   Used for:  Left inguinal hernia        Dose:  1-2 tablet   Take 1-2 tablets by mouth 2 times daily Take while on oral narcotics to prevent or treat constipation.   Quantity:  30 tablet   Refills:  0         CONTINUE these medicines which have NOT CHANGED        Dose / Directions    fexofenadine 60 MG tablet   Commonly known as:  ALLEGRA        TAKE ONE TABLET BY MOUTH TWICE DAILY AS NEEDED   Refills:  0       fluticasone 50 MCG/ACT spray   Commonly known as:  FLONASE        Dose:  2 spray   2 sprays   Refills:  0       ibuprofen 200 MG tablet   Commonly known as:  ADVIL/MOTRIN        Dose:  200 mg   Take 200 mg by mouth    Refills:  0       valACYclovir 1000 mg tablet   Commonly known as:  VALTREX        Dose:  1 g   Take 1 g by mouth   Refills:  0            Where to get your medicines      These medications were sent to [facility] [place], [place] - [address]  [address], [facility] [address]    Hours:  TRANSPLANT PHONE NUMBER [phone] Phone:  [phone]     senna-docusate 8.6-50 MG per tablet         Some of these will need a paper prescription and others can be bought over the counter. Ask your nurse if you have questions.     Bring a paper prescription for each of these medications     HYDROcodone-acetaminophen 5-325 MG per tablet                Protect others around you: Learn how to safely use, store and throw away your medicines at www.disposemymeds.org.             Medication List: This is a list of all your medications and when to take them. Check marks below indicate your daily home schedule. Keep this list as a reference.      Medications           Morning Afternoon Evening Bedtime As Needed    fexofenadine 60 MG tablet   Commonly known as:  ALLEGRA   TAKE ONE TABLET BY MOUTH TWICE DAILY AS NEEDED                                fluticasone 50 MCG/ACT spray   Commonly known as:  FLONASE   2 sprays                                HYDROcodone-acetaminophen 5-325 MG per tablet   Commonly known as:  NORCO   Take 1-2 tablets by mouth every 4 hours as needed for other (Moderate to Severe Pain)                                ibuprofen 200 MG tablet   Commonly known as:  ADVIL/MOTRIN   Take 200 mg by mouth                                senna-docusate 8.6-50 MG per tablet   Commonly known as:  SENOKOT-S;PERICOLACE   Take 1-2 tablets by mouth 2 times daily Take while on oral narcotics to prevent or treat constipation.                                valACYclovir 1000 mg tablet   Commonly known as:  VALTREX   Take 1 g by mouth

## 2017-11-17 NOTE — ANESTHESIA CARE TRANSFER NOTE
Patient: Khang Gary    Procedure(s):  Laparoscopic Left Inguinal Hernia Repair with Mesh - Wound Class: I-Clean    Diagnosis: Inguinal Hernia  Diagnosis Additional Information: No value filed.    Anesthesia Type:   No value filed.     Note:  Airway :Nasal Cannula  Patient transferred to:PACU  Comments: Patient to PACU on 4L O2 via NC and is somnolent but has a patent airway. Report to RN and transfer of care. BP: 118/79 HR: 60 RR: 14 SpO2: 99% in RA Temp: 97.2      Vitals: (Last set prior to Anesthesia Care Transfer)    CRNA VITALS  11/17/2017 0745 - 11/17/2017 0821      11/17/2017             Pulse: 64    SpO2: 98 %    Resp Rate (observed): 22                Electronically Signed By: CAROL Huizar CRNA  November 17, 2017  8:21 AM

## 2017-11-17 NOTE — OP NOTE
PREOPERATIVE DIAGNOSIS:  Left inguinal hernia.        POSTOPERATIVE DIAGNOSIS:  Left inguinal hernia.      OPERATIVE PROCEDURE:  Laparoscopic left inguinal hernioplasty, total extraperitoneal approach.      SURGEON:  Morales Koenig MD      ANESTHESIA:  General endotracheal.      INDICATIONS FOR PROCEDURE:  The patient presents for a left inguinal hernia.  Informed consent was obtained.      OPERATIVE FINDINGS:  Left inguinal indirect hernia.      PROCEDURE:  The patient was brought to the operating room and put under general anesthesia.  His abdomen was widely prepped and draped in the usual sterile fashion.  An infraumbilical skin incision was made, open technique used with the rectus opened.  The rectus was pushed out laterally, and a balloon dissector was placed in the midline for good visualization of the midline preperitoneal space.  Two 5 ports were placed under direct vision without difficulty.  I proceeded to open the left preperitoneal space, thus allowing good visualization of the left indirect sac, which was taken off the left cord.  Good peritonealization of the cord was completed.  I then placed a 15 x 10 cm Parietex ProGrip mesh per my routine technique to completely cover the left myopectineal orifice.  The preperitoneal space was then decompressed noting good placement of the mesh.  Ports were removed.  Fascial defect was closed with 0 Vicryl, skin with subcuticular, and Steri-Strips were applied.  Estimated blood loss was minimal.  The patient tolerated the procedure well and was taken to the recovery room, where he was without difficulty or apparent complication.         MORALES KOENIG MD             D: 2017 09:48   T: 2017 10:43   MT: mm      Name:     JORDY BUCHAANN   MRN:      [id number]        Account:        [id number]   :      1975           Procedure Date: 2017      Document: [id number]       cc: [facility] Billing

## 2017-11-17 NOTE — IP AVS SNAPSHOT
[facility]    [address] [place] [id number]    Phone:  [phone]    Fax:  [phone]                                       After Visit Summary   11/17/2017    Khang Gary    MRN: [id number]           After Visit Summary Signature Page     I have received my discharge instructions, and my questions have been answered. I have discussed any challenges I see with this plan with the nurse or doctor.    ..........................................................................................................................................  Patient/Patient Representative Signature      ..........................................................................................................................................  Patient Representative Print Name and Relationship to Patient    ..................................................               ................................................  Date                                            Time    ..........................................................................................................................................  Reviewed by Signature/Title    ...................................................              ..............................................  Date                                                            Time

## 2017-11-17 NOTE — BRIEF OP NOTE
[facility] Brief Operative Note    Pre-operative diagnosis: Inguinal Hernia   Post-operative diagnosis Same as Pre-op Dx   Procedure: Procedure(s):  Laparoscopic Left Inguinal Hernia Repair with Mesh - Wound Class: I-Clean   Surgeon: Morales Koenig MD   Assistants(s):    Estimated blood loss: Minimal    Specimens: None   Findings: LIIH

## 2017-11-17 NOTE — ANESTHESIA POSTPROCEDURE EVALUATION
Patient: Khang Gary    Procedure(s):  Laparoscopic Left Inguinal Hernia Repair with Mesh - Wound Class: I-Clean    Diagnosis:Inguinal Hernia  Diagnosis Additional Information: No value filed.    Anesthesia Type:  No value filed.    Note:  Anesthesia Post Evaluation    Patient location during evaluation: PACU  Patient participation: Able to fully participate in evaluation  Level of consciousness: awake and alert  Pain management: adequate  Airway patency: patent  Cardiovascular status: acceptable  Respiratory status: acceptable  Hydration status: acceptable  PONV: none     Anesthetic complications: None          Last vitals:  Vitals:    11/17/17 0819 11/17/17 0830 11/17/17 0843   BP: 118/79 (!) 125/96 (!) 124/91   Pulse:      Resp: 17 14 9   Temp: 36.3  C (97.3  F)  36.2  C (97.2  F)   SpO2: 99% 97% 100%         Electronically Signed By: Josse Estes MD  November 17, 2017  8:56 AM

## 2017-11-17 NOTE — DISCHARGE INSTRUCTIONS
"[facility]  Home Care Following Anesthesia  For 24 hours after surgery:  1. Get plenty of rest.  A responsible adult must stay with you for at least 24 hours after you leave the surgery center.  2. Do not drive or use heavy equipment.  If you have weakness or tingling, don't drive or use heavy equipment until this feeling goes away.   3. Do not drink alcohol.   4. Avoid strenuous or risky activities.  Ask for help when climbing stairs.  5. You may feel lightheaded.  IF so, sit for a few minutes before standing.  Have someone help you get up.   6. If you have nausea (feel sick to your stomach): Drink only clear liquids such as apple juice, ginger ale, broth or 7-Up.  Rest may also help.  Be sure to drink enough fluids.  Move to a regular diet as you feel able.   7. You may have a slight fever.  Call the doctor if your fever is over 100 F (37.7 C) (taken under the tongue) or lasts longer than 24 hours.  8. You may have a dry mouth, a sore throat, muscle aches or trouble sleeping. These should go away after 24 hours.  9. Do not make important or legal decisions.        Today you received a Marcaine or bupivacaine block to numb the nerves near your surgery site.  This is a block using local anesthetic or \"numbing\" medication injected around the nerves to anesthetize or \"numb\" the area supplied by those nerves.  This block is injected into the muscle layer near your surgical site.  The medication may numb the location where you had surgery for 6-18 hours, but may last up to 24 hours.  If your surgical site is an arm or leg you should be careful with your affected limb, since it is possible to injure your limb without being aware of it due to the numbing.  Until full feeling returns, you should guard against bumping or hitting your limb, and avoid extreme hot or cold temperatures on the skin.  As the block wears off, the feeling will return as a tingling or prickly sensation near your " surgical site.  You will experience more discomfort from your incision as the feeling returns.  You may want to take a pain pill (a narcotic or Tylenol if this was prescribed by your surgeon) when you start to experience mild pain before the pain beccomes more severe.  If your pain medications do not control your pain you should notifiy your surgeon.    Tips for taking pain medications  To get the best pain relief possible, remember these points:    Take pain medications as directed, before pain becomes severe.    Pain medication can upset your stomach: taking it with food may help.    Constipation is a common side effect of pain medication. Drink plenty of  fluids.    Eat foods high in fiber. Take a stool softener if recommended by your doctor or pharmacist.    Do not drink alcohol, drive or operate machinery while taking pain medications.    Ask about other ways to control pain, such as with heat, ice or relaxation.    Tylenol/Acetaminophen Consumption  To help encourage the safe use of acetaminophen, the makers of TYLENOL  have lowered the maximum daily dose for single-ingredient Extra Strength TYLENOL  (acetaminophen) products sold in the [place] from 8 pills per day (4,000 mg) to 6 pills per day (3,000 mg). The dosing interval has also changed from 2 pills every 4-6 hours to 2 pills every 6 hours.    If you feel your pain relief is insufficient, you may take Tylenol/Acetaminophen in addition to your narcotic pain medication.     Be careful not to exceed 3,000 mg of Tylenol/Acetaminophen in a 24 hour period from all sources.    If you are taking extra strength Tylenol/acetaminophen (500 mg), the maximum dose is 6 tablets in 24 hours.    If you are taking regular strength acetaminophen (325 mg), the maximum dose is 9 tablets in 24 hours.    Call a doctor for any of the followin. Signs of infection (fever, growing tenderness at the surgery site, a large amount of drainage or bleeding, severe pain, foul-smelling  drainage, redness, swelling).  2. It has been over 8 to 10 hours since surgery and you are still not able to urinate (pass water).  3. Headache for over 24 hours.    Your doctor is:  Dr. Morales Koenig, General Surgery: [phone]                  Or dial [phone] and ask for the resident on call for:  General Surgery  For emergency care, call the:  [facility]:  [phone] (TTY for hearing impaired: [phone])      After Your Laparoscopic Inguinal Hernia Repair         Incision care     Remove the bandage the day after surgery, but leave the medical tape (Steri-Strips) or glue in place. These will loosen and fall off on their own 5 to 7 days after surgery.     You may take a shower the day after surgery. Carefully wash your incision with soap and water. Do not submerge yourself in water (bath, whirlpool, hot tub, pool, lake) for 14 days after surgery.       Always wash your hands before touching your incisions or removing bandages.      Other medicines     Wait to start aspirin or blood thinners until the day after surgery. You can continue your regular medicines at your normal time the day after surgery.     Your pain medicine may cause constipation (hard, dry stools). To help with this, take the stool softener your doctor gave you or an over-the-counter stool softener or laxative. You can stop taking this when you are no longer taking pain medicine and your bowel movements are back to normal.      For pain or discomfort     Take the narcotic pain medicine your doctor gave you as needed and as instructed on the bottle. If you prefer to use over-the-counter medication, use acetaminophen (Tylenol) or ibuprofen (Advil, Motrin) as instructed on the box. Do not take Tylenol if it is in your narcotic pain medication.     Use an ice pack on your groin for 20 minutes at a time as needed for the first 24 hours. Be sure to protect your skin by putting a cloth between the ice pack and your skin.      After 24 hours you can switch to heat for 20 minutes as needed. Be sure to protect your skin by putting a cloth between the heat pack and your skin.     It is normal to feel a lump in your groin after surgery. This lump may take up to 8 weeks to go away.      Activities     No driving until you feel it s safe to do so. Don t drive while taking narcotic pain medicine.     You can return to your other activities as normal, no restrictions.      Special equipment     If we gave you an athletic supporter, wear this for the first 3 days after surgery. You can wear it longer than this if you wish.      Diet     You can eat your regular meals after surgery.      When to call the doctor   Call your doctor if you have:     A fever above 101 F (38.3 C) (taken under the tongue), or a fever or chills lasting more than a day.     Redness at the incision site.     Any fluid or blood draining from the incision, especially if it smells bad.      Severe pain that doesn t improve with pain medicine.      Go to the emergency room if:   You can t urinate (pee) or feel pressure when you urinate. We will place a small, thin tube (catheter) to empty your bladder. This needs to stay in place for at least 2 days.      We will call you 2 to 4 days after surgery to review this handout, answer questions and help arrange after-surgery care. If you have questions or concerns, please call [phone] during regular office hours. If you need to call after business hours, call [phone] and ask to page the surgeon on-call.

## 2017-11-17 NOTE — ANESTHESIA PREPROCEDURE EVALUATION
Anesthesia Evaluation     . Pt has had prior anesthetic.     No history of anesthetic complications          ROS/MED HX    ENT/Pulmonary:     (+)allergic rhinitis, , . .    Neurologic:       Cardiovascular:         METS/Exercise Tolerance:  >4 METS   Hematologic:         Musculoskeletal:   (+) , , other musculoskeletal- inguinal hernia      GI/Hepatic:         Renal/Genitourinary:         Endo:         Psychiatric:         Infectious Disease:         Malignancy:         Other:                   Procedure: Procedure(s):  Laparoscopic Left Inguinal Hernia Repair with Mesh - Wound Class: I-Clean    HPI: Khang Gary is a 42 year old male with inguinal hernia and scheduled for above procedure    PMHx/PSHx:  History reviewed. No pertinent past medical history.    Past Surgical History:   Procedure Laterality Date     HERNIA REPAIR Right 1997    inguinal hernia repair         Current Outpatient Prescriptions on File Prior to Encounter:  ibuprofen (ADVIL/MOTRIN) 200 MG tablet Take 200 mg by mouth   fluticasone (FLONASE) 50 MCG/ACT spray 2 sprays   valACYclovir (VALTREX) 1000 mg tablet Take 1 g by mouth   fexofenadine (ALLEGRA) 60 MG tablet TAKE ONE TABLET BY MOUTH TWICE DAILY AS NEEDED     No current facility-administered medications on file prior to encounter.     Social Hx:   Social History   Substance Use Topics     Smoking status: Never Smoker     Smokeless tobacco: Never Used     Alcohol use Yes       Allergies: No Known Allergies      NPO Status: Per ASA Guidelines    Labs:    Blood Bank:  No results found for: ABO, RH, AS  BMP:  No results for input(s): NA, POTASSIUM, CHLORIDE, CO2, BUN, CR, GLC, ROM in the last 54044 hours.  CBC:   No results for input(s): WBC, RBC, HGB, HCT, MCV, MCH, MCHC, RDW, PLT in the last 55832 hours.  Coags:  No results for input(s): INR, PTT, FIBR in the last 03888 hours.      Physical Exam  Normal systems: cardiovascular    Airway   Mallampati: I  TM distance: >3 FB  Neck ROM:  full    Dental   (+) chipped    Cardiovascular       Pulmonary    breath sounds clear to auscultation                    Anesthesia Plan      History & Physical Review  History and physical reviewed and following examination; no interval change.    ASA Status:  1 .    NPO Status:  > 6 hours    Plan for General and ETT with Intravenous and Propofol induction. Maintenance will be TIVA.    PONV prophylaxis:  Ondansetron (or other 5HT-3) and Dexamethasone or Solumedrol       Postoperative Care  Postoperative pain management:  IV analgesics and Multi-modal analgesia.      Consents  Anesthetic plan, risks, benefits and alternatives discussed with:  Patient..                  History and physical assessed; Patient examined.   Risks and alternatives presented and discussed. Patient and family agree. All questions answered.      Josse Estes MD  Staff Anesthesiologist  *[phone]

## 2017-11-20 ENCOUNTER — CARE COORDINATION (OUTPATIENT)
Dept: SURGERY | Facility: CLINIC | Age: 42
End: 2017-11-20

## 2017-11-20 NOTE — PROGRESS NOTES
Khang Gary is a patient of Dr. Morales Koenig that underwent a Laparoscopic left inguinal hernia repair approximately 3 days ago.  Attempted to contact patient via telephone for a status update and review post op teaching.  LM on VM to call office.  Await return call.      Of note:  Pathology:  NA  Wound:  Steri-strips  Follow-up:  Routine  Restrictions:  - No activity restrictions  New medications:  Norco, Senna

## 2017-11-22 NOTE — PROGRESS NOTES
RN Post-Op/Post-Discharge Care Coordination Note    Mr. Khang Gary is a 42 year old male who underwent laparoscopic LIH on 11/17 with  Dr. Morales Koenig.  Spoke with Patient.    Support  Patient able to care for self independently     Health Status  Fevers/chills: Patient denies any fever or chills.  Nausea/Vomiting: Patient denies nausea/vomiting.  Eating/drinking: Patient is able to eat and drink without any complaints.  Bowel habits: Patient reports having a normal bowel movement.  Urinary: Voiding normally  Drains (EDMUNDO): N/A  Incisions: Patient denies any signs and symptoms of infection. Steri strips intact- starting to loosen.  Pain: Minimal to null.  Not taking narcotics, may use PRN Ibuprofen per package instructions but currently does not feel the need.  New Medications:  Norco, Senna (not taking)    Activity/Restrictions  No restrictions    Equipment  None    Pathology reviewed with patient:  N/A    All of his questions were answered including reviewing restrictions and wound care.  He will call this office if he has any further questions and/or concerns.      In lieu of a post-op clinic patient that patient would like to be contacted in approximately 10 days via telephone.    Whom and When to Call  Patient acknowledges understanding of how to manage any medication changes and   when to seek medical care.     Patient advised that if after hour medical concerns arise to please call [phone] and choose option 4 to speak to the physician on call.     A copy of this note was routed to the primary surgeon.

## 2017-12-01 ENCOUNTER — CARE COORDINATION (OUTPATIENT)
Dept: SURGERY | Facility: CLINIC | Age: 42
End: 2017-12-01

## 2017-12-01 NOTE — PROGRESS NOTES
Khang Gary was contacted several days post procedure via telephone for a status update and elected to have a telephone follow -up call approximately 10 days after original contact in lieu of a clinic visit with Dr. Morales Koenig.  He continues to do well and the steri-strips have peeled off.  The patients wounds are healed and the area is C/D/I.  He is afebrile, pain free, and dewey po; the patient is slowly resuming his normal activity.       Pathology was reviewed with the patient: N/A    All of Khang Gary question's were answered and  he would like to return to the clinic on a PRN basis.      A copy of this note was routed to his surgeon.

## 2020-03-11 ENCOUNTER — HEALTH MAINTENANCE LETTER (OUTPATIENT)
Age: 45
End: 2020-03-11

## 2020-12-27 ENCOUNTER — HEALTH MAINTENANCE LETTER (OUTPATIENT)
Age: 45
End: 2020-12-27

## 2021-04-25 ENCOUNTER — HEALTH MAINTENANCE LETTER (OUTPATIENT)
Age: 46
End: 2021-04-25

## 2021-10-09 ENCOUNTER — HEALTH MAINTENANCE LETTER (OUTPATIENT)
Age: 46
End: 2021-10-09

## 2022-05-21 ENCOUNTER — HEALTH MAINTENANCE LETTER (OUTPATIENT)
Age: 47
End: 2022-05-21

## 2022-09-17 ENCOUNTER — HEALTH MAINTENANCE LETTER (OUTPATIENT)
Age: 47
End: 2022-09-17

## 2023-06-04 ENCOUNTER — HEALTH MAINTENANCE LETTER (OUTPATIENT)
Age: 48
End: 2023-06-04

## (undated) DEVICE — DECANTER VIAL 2006S

## (undated) DEVICE — DRSG BANDAID 1X3" PLASTIC LATEX FREE

## (undated) DEVICE — SUPPORTER ATHLETIC LG LATEX 202636

## (undated) DEVICE — ESU GROUND PAD ADULT W/CORD E7507

## (undated) DEVICE — LINEN ORTHO PACK 5446

## (undated) DEVICE — DRSG BANDAID 1X3" FABRIC CURITY LATEX FREE KC44101

## (undated) DEVICE — GLOVE PROTEXIS W/NEU-THERA 7.5  2D73TE75

## (undated) DEVICE — SUCTION MANIFOLD NEPTUNE 2 SYS 4 PORT 0702-020-000

## (undated) DEVICE — SOL NACL 0.9% IRRIG 500ML BOTTLE 2F7123

## (undated) DEVICE — SU VICRYL 0 CT-2 27" J334H

## (undated) DEVICE — DRSG STERI STRIP 1/2X4" R1547

## (undated) DEVICE — LINEN GOWN XLG 5407

## (undated) DEVICE — PREP CHLORAPREP 26ML TINTED ORANGE  260815

## (undated) DEVICE — DRSG GAUZE 4X8"

## (undated) DEVICE — ANTIFOG SOLUTION W/FOAM PAD 31142527

## (undated) DEVICE — SU MONOCRYL 4-0 PS-2 18" UND Y496G

## (undated) DEVICE — PACK LAP CHOLE CUSTOM ASC

## (undated) DEVICE — ESU PENCIL W/HOLSTER

## (undated) DEVICE — DISSECTOR BALLOON RND SPACEMAKER SMBTTRND

## (undated) RX ORDER — BUPIVACAINE HYDROCHLORIDE 5 MG/ML
INJECTION, SOLUTION EPIDURAL; INTRACAUDAL
Status: DISPENSED
Start: 2017-11-17

## (undated) RX ORDER — KETAMINE HYDROCHLORIDE 10 MG/ML
INJECTION, SOLUTION INTRAMUSCULAR; INTRAVENOUS
Status: DISPENSED
Start: 2017-11-17

## (undated) RX ORDER — ACETAMINOPHEN 325 MG/1
TABLET ORAL
Status: DISPENSED
Start: 2017-11-17

## (undated) RX ORDER — GABAPENTIN 300 MG/1
CAPSULE ORAL
Status: DISPENSED
Start: 2017-11-17

## (undated) RX ORDER — FENTANYL CITRATE 50 UG/ML
INJECTION, SOLUTION INTRAMUSCULAR; INTRAVENOUS
Status: DISPENSED
Start: 2017-11-17

## (undated) RX ORDER — OXYCODONE HYDROCHLORIDE 5 MG/1
TABLET ORAL
Status: DISPENSED
Start: 2017-11-17